# Patient Record
Sex: FEMALE | Race: WHITE | NOT HISPANIC OR LATINO | URBAN - METROPOLITAN AREA
[De-identification: names, ages, dates, MRNs, and addresses within clinical notes are randomized per-mention and may not be internally consistent; named-entity substitution may affect disease eponyms.]

---

## 2017-05-19 ENCOUNTER — EMERGENCY (EMERGENCY)
Facility: HOSPITAL | Age: 27
LOS: 1 days | Discharge: HOME | End: 2017-05-19
Attending: EMERGENCY MEDICINE
Payer: COMMERCIAL

## 2017-05-19 VITALS
OXYGEN SATURATION: 100 % | WEIGHT: 130.07 LBS | HEART RATE: 92 BPM | TEMPERATURE: 98 F | SYSTOLIC BLOOD PRESSURE: 115 MMHG | RESPIRATION RATE: 18 BRPM | HEIGHT: 64 IN | DIASTOLIC BLOOD PRESSURE: 79 MMHG

## 2017-05-19 DIAGNOSIS — Z88.0 ALLERGY STATUS TO PENICILLIN: ICD-10-CM

## 2017-05-19 DIAGNOSIS — R07.89 OTHER CHEST PAIN: ICD-10-CM

## 2017-05-19 DIAGNOSIS — R10.13 EPIGASTRIC PAIN: ICD-10-CM

## 2017-05-19 DIAGNOSIS — Z88.8 ALLERGY STATUS TO OTHER DRUGS, MEDICAMENTS AND BIOLOGICAL SUBSTANCES STATUS: ICD-10-CM

## 2017-05-19 DIAGNOSIS — R06.02 SHORTNESS OF BREATH: ICD-10-CM

## 2017-05-19 DIAGNOSIS — Z88.1 ALLERGY STATUS TO OTHER ANTIBIOTIC AGENTS STATUS: ICD-10-CM

## 2017-05-19 DIAGNOSIS — Z88.2 ALLERGY STATUS TO SULFONAMIDES: ICD-10-CM

## 2017-05-19 LAB
ALBUMIN SERPL ELPH-MCNC: 4.8 G/DL — SIGNIFICANT CHANGE UP (ref 3.3–5.2)
ALP SERPL-CCNC: 59 U/L — SIGNIFICANT CHANGE UP (ref 40–120)
ALT FLD-CCNC: 12 U/L — SIGNIFICANT CHANGE UP
ANION GAP SERPL CALC-SCNC: 14 MMOL/L — SIGNIFICANT CHANGE UP (ref 5–17)
AST SERPL-CCNC: 20 U/L — SIGNIFICANT CHANGE UP
BASOPHILS # BLD AUTO: 0 K/UL — SIGNIFICANT CHANGE UP (ref 0–0.2)
BASOPHILS NFR BLD AUTO: 0.4 % — SIGNIFICANT CHANGE UP (ref 0–2)
BILIRUB SERPL-MCNC: 0.3 MG/DL — LOW (ref 0.4–2)
BUN SERPL-MCNC: 13 MG/DL — SIGNIFICANT CHANGE UP (ref 8–20)
CALCIUM SERPL-MCNC: 9.5 MG/DL — SIGNIFICANT CHANGE UP (ref 8.6–10.2)
CHLORIDE SERPL-SCNC: 102 MMOL/L — SIGNIFICANT CHANGE UP (ref 98–107)
CO2 SERPL-SCNC: 24 MMOL/L — SIGNIFICANT CHANGE UP (ref 22–29)
CREAT SERPL-MCNC: 0.59 MG/DL — SIGNIFICANT CHANGE UP (ref 0.5–1.3)
D DIMER BLD IA.RAPID-MCNC: <150 NG/ML DDU — SIGNIFICANT CHANGE UP
EOSINOPHIL # BLD AUTO: 0.1 K/UL — SIGNIFICANT CHANGE UP (ref 0–0.5)
EOSINOPHIL NFR BLD AUTO: 1 % — SIGNIFICANT CHANGE UP (ref 0–6)
GLUCOSE SERPL-MCNC: 113 MG/DL — SIGNIFICANT CHANGE UP (ref 70–115)
HCT VFR BLD CALC: 42.6 % — SIGNIFICANT CHANGE UP (ref 37–47)
HGB BLD-MCNC: 14.7 G/DL — SIGNIFICANT CHANGE UP (ref 12–16)
LYMPHOCYTES # BLD AUTO: 1.9 K/UL — SIGNIFICANT CHANGE UP (ref 1–4.8)
LYMPHOCYTES # BLD AUTO: 26.3 % — SIGNIFICANT CHANGE UP (ref 20–55)
MCHC RBC-ENTMCNC: 32.5 PG — HIGH (ref 27–31)
MCHC RBC-ENTMCNC: 34.5 G/DL — SIGNIFICANT CHANGE UP (ref 32–36)
MCV RBC AUTO: 94 FL — SIGNIFICANT CHANGE UP (ref 81–99)
MONOCYTES # BLD AUTO: 0.5 K/UL — SIGNIFICANT CHANGE UP (ref 0–0.8)
MONOCYTES NFR BLD AUTO: 6.6 % — SIGNIFICANT CHANGE UP (ref 3–10)
NEUTROPHILS # BLD AUTO: 4.7 K/UL — SIGNIFICANT CHANGE UP (ref 1.8–8)
NEUTROPHILS NFR BLD AUTO: 65.6 % — SIGNIFICANT CHANGE UP (ref 37–73)
PLATELET # BLD AUTO: 244 K/UL — SIGNIFICANT CHANGE UP (ref 150–400)
POTASSIUM SERPL-MCNC: 4.3 MMOL/L — SIGNIFICANT CHANGE UP (ref 3.5–5.3)
POTASSIUM SERPL-SCNC: 4.3 MMOL/L — SIGNIFICANT CHANGE UP (ref 3.5–5.3)
PROT SERPL-MCNC: 8.1 G/DL — SIGNIFICANT CHANGE UP (ref 6.6–8.7)
RBC # BLD: 4.53 M/UL — SIGNIFICANT CHANGE UP (ref 4.4–5.2)
RBC # FLD: 12.5 % — SIGNIFICANT CHANGE UP (ref 11–15.6)
SODIUM SERPL-SCNC: 140 MMOL/L — SIGNIFICANT CHANGE UP (ref 135–145)
WBC # BLD: 7.1 K/UL — SIGNIFICANT CHANGE UP (ref 4.8–10.8)
WBC # FLD AUTO: 7.1 K/UL — SIGNIFICANT CHANGE UP (ref 4.8–10.8)

## 2017-05-19 PROCEDURE — 85027 COMPLETE CBC AUTOMATED: CPT

## 2017-05-19 PROCEDURE — 71046 X-RAY EXAM CHEST 2 VIEWS: CPT

## 2017-05-19 PROCEDURE — 99284 EMERGENCY DEPT VISIT MOD MDM: CPT

## 2017-05-19 PROCEDURE — 85379 FIBRIN DEGRADATION QUANT: CPT

## 2017-05-19 PROCEDURE — 80053 COMPREHEN METABOLIC PANEL: CPT

## 2017-05-19 PROCEDURE — 99284 EMERGENCY DEPT VISIT MOD MDM: CPT | Mod: 25

## 2017-05-19 PROCEDURE — 71020: CPT | Mod: 26

## 2017-05-19 PROCEDURE — 93005 ELECTROCARDIOGRAM TRACING: CPT

## 2017-05-19 PROCEDURE — 93010 ELECTROCARDIOGRAM REPORT: CPT

## 2017-05-19 PROCEDURE — 96374 THER/PROPH/DIAG INJ IV PUSH: CPT

## 2017-05-19 RX ORDER — PANTOPRAZOLE SODIUM 20 MG/1
40 TABLET, DELAYED RELEASE ORAL ONCE
Qty: 0 | Refills: 0 | Status: COMPLETED | OUTPATIENT
Start: 2017-05-19 | End: 2017-05-19

## 2017-05-19 RX ORDER — PANTOPRAZOLE SODIUM 20 MG/1
1 TABLET, DELAYED RELEASE ORAL
Qty: 30 | Refills: 0 | OUTPATIENT
Start: 2017-05-19 | End: 2017-06-18

## 2017-05-19 RX ADMIN — PANTOPRAZOLE SODIUM 40 MILLIGRAM(S): 20 TABLET, DELAYED RELEASE ORAL at 12:06

## 2017-05-19 NOTE — ED ADULT TRIAGE NOTE - CHIEF COMPLAINT QUOTE
mid sternal chest pain x few weeks, wheh taking a deep breath pain is worse and when she lays down pain is worse, feels tight mid sternal chest pain x few weeks, when taking a deep breath pain is worse and when she lays down pain is worse, feels tight, , went to urgent care this morning , had ekg and urine pregnancy test all was negative

## 2017-05-19 NOTE — ED STATDOCS - PROGRESS NOTE DETAILS
Patient presents c/o intermittent chest pressure, and SOB for past few weeks, patient states does have GERD, feels like that may be causing symptoms. patient denies calf pain, ALFARO. patient in room states not currently SOB, patient did fly however symptoms started prior to flight. patient in room comfortable in NAD, lungs CTA, calf soft nt nd bilaterally, will follow results and discuss with attending

## 2017-05-19 NOTE — ED STATDOCS - PLAN OF CARE
[place] diet for 2 weeks: no fatty foods, fried foods, spicy foods.  Avoid citrus products (oranges, lonnie, grapefruits).  Avoid seeded veggies (tomatoes, tomato sauces, peppers and cucumbers).  Avoid caffeine (coffee, soda). Pantoprazole as prescribed.  Return immediately to the ER for re-evaluation if your symptoms recur or worsening.

## 2017-05-19 NOTE — ED ADULT NURSE NOTE - OBJECTIVE STATEMENT
pt c/o a pain to upper epigastric area for about 5 weeks, reports intermittent pain , somewhat relieved by burping. pt reports hx of reflux but not currently on any medications for it. pt did experience some shortness of breath today

## 2017-05-19 NOTE — ED STATDOCS - ENMT, MLM
Nasal mucosa clear.  Mouth with normal mucosa  Throat has no vesicles, no oropharyngeal exudates and uvula is midline. Nasal mucosa clear.  Mouth with normal mucosa, no oropharyngeal erythema.

## 2017-05-19 NOTE — ED STATDOCS - NS ED MD SCRIBE ATTENDING SCRIBE SECTIONS
HISTORY OF PRESENT ILLNESS/HIV/PROGRESS NOTE/REVIEW OF SYSTEMS/VITAL SIGNS( Pullset)/CONSULTATIONS/SHIFT CHANGE/PAST MEDICAL/SURGICAL/SOCIAL HISTORY/INTAKE ASSESSMENT/SCREENINGS/RESULTS/PHYSICAL EXAM/DISPOSITION

## 2017-05-19 NOTE — ED STATDOCS - MEDICAL DECISION MAKING DETAILS
Likely GERD. Will obtain D-dimer, X-ray, omeprazole, and re-eval. Further testing as dictated by results.

## 2017-05-19 NOTE — ED STATDOCS - CARE PLAN
Principal Discharge DX:	Chest pain of uncertain etiology  Instructions for follow-up, activity and diet:	[place] diet for 2 weeks: no fatty foods, fried foods, spicy foods.  Avoid citrus products (oranges, lonnie, grapefruits).  Avoid seeded veggies (tomatoes, tomato sauces, peppers and cucumbers).  Avoid caffeine (coffee, soda). Pantoprazole as prescribed.  Return immediately to the ER for re-evaluation if your symptoms recur or worsening. Principal Discharge DX:	Chest pain of uncertain etiology  Instructions for follow-up, activity and diet:	[place] diet for 2 weeks: no fatty foods, fried foods, spicy foods.  Avoid citrus products (oranges, lonnie, grapefruits).  Avoid seeded veggies (tomatoes, tomato sauces, peppers and cucumbers).  Avoid caffeine (coffee, soda). Pantoprazole as prescribed.  Return immediately to the ER for re-evaluation if your symptoms recur or worsening.

## 2017-05-19 NOTE — ED ADULT NURSE NOTE - CHIEF COMPLAINT QUOTE
mid sternal chest pain x few weeks, when taking a deep breath pain is worse and when she lays down pain is worse, feels tight, , went to urgent care this morning , had ekg and urine pregnancy test all was negative

## 2017-05-19 NOTE — ED STATDOCS - CARDIAC, MLM
NO pedal edema, No calf Edema. Palpable Radial Pulses JAMISON. Palpable Dorsal Pedal  Pulses JAMISON. normal rate, regular rhythm, and no murmur.

## 2017-05-19 NOTE — ED STATDOCS - OBJECTIVE STATEMENT
25 y/o female with a hx of GERD (diagnosed with biopsy) presents to the ED c/o CP that onset yesterday. Pt has been having this pain on and off for 1 week with episode lasting about 1 hour. Pt notes that CP feels better at this time and feels similar to her GERD. Has had similar episodes in the past weeks but pain lasted longer this time. Pain is alleviated by blenching. Usually episode lasts about 1 hour and is alleviated by its own. Describes pain as a pressure with pain between shoulder blades as well as SOB. Takes oral contraceptive. Family hx of cardiac disease. Denies vomiting, leg swelling,  diaphoresis, numbness, tingling, long travel, leg pain, or MACHUCA. GI: Dr. Haines. No further complaints at this time. 27 y/o female with a hx of GERD (diagnosed with biopsy 7 yrs ago) presents to the ED c/o chest hurting and sob intermittently for a couple weeks:  sharp/ tight episodes raditing to back with assoc SOB lasting about 1 hour. Has had discomfort since last night, feels "really tight" although has improved on own since coming to the ED and appears to be alleviated by blenching. Usually episode lasts about 1 hour and is alleviated by its own. Describes pain as a pressure with pain between shoulder blades as well as SOB. Takes oral contraceptive. Family hx of cardiac disease. Denies vomiting, leg swelling,  diaphoresis, numbness, tingling, long travel, leg pain, or MACHUCA. GI: Dr. Haines. No further complaints at this time.

## 2018-02-27 ENCOUNTER — EMERGENCY (EMERGENCY)
Facility: HOSPITAL | Age: 28
LOS: 1 days | Discharge: HOME | End: 2018-02-27
Attending: EMERGENCY MEDICINE
Payer: COMMERCIAL

## 2018-02-27 VITALS
OXYGEN SATURATION: 100 % | TEMPERATURE: 98 F | HEART RATE: 90 BPM | DIASTOLIC BLOOD PRESSURE: 70 MMHG | SYSTOLIC BLOOD PRESSURE: 138 MMHG | RESPIRATION RATE: 18 BRPM

## 2018-02-27 VITALS — HEIGHT: 64 IN | WEIGHT: 130.07 LBS

## 2018-02-27 PROCEDURE — 99283 EMERGENCY DEPT VISIT LOW MDM: CPT

## 2018-02-27 PROCEDURE — 99283 EMERGENCY DEPT VISIT LOW MDM: CPT | Mod: 25

## 2018-02-27 RX ORDER — HYDROXYZINE HCL 10 MG
1 TABLET ORAL
Qty: 15 | Refills: 0 | OUTPATIENT
Start: 2018-02-27

## 2018-02-27 RX ORDER — LORATADINE 10 MG/1
10 TABLET ORAL DAILY
Qty: 0 | Refills: 0 | Status: DISCONTINUED | OUTPATIENT
Start: 2018-02-27 | End: 2018-03-03

## 2018-02-27 RX ORDER — FAMOTIDINE 10 MG/ML
1 INJECTION INTRAVENOUS
Qty: 5 | Refills: 0 | OUTPATIENT
Start: 2018-02-27 | End: 2018-03-03

## 2018-02-27 RX ORDER — FAMOTIDINE 10 MG/ML
20 INJECTION INTRAVENOUS DAILY
Qty: 0 | Refills: 0 | Status: DISCONTINUED | OUTPATIENT
Start: 2018-02-27 | End: 2018-03-03

## 2018-02-27 RX ORDER — CETIRIZINE HYDROCHLORIDE 10 MG/1
1 TABLET ORAL
Qty: 14 | Refills: 0 | OUTPATIENT
Start: 2018-02-27 | End: 2018-03-12

## 2018-02-27 RX ADMIN — FAMOTIDINE 20 MILLIGRAM(S): 10 INJECTION INTRAVENOUS at 08:58

## 2018-02-27 RX ADMIN — Medication 40 MILLIGRAM(S): at 08:58

## 2018-02-27 RX ADMIN — LORATADINE 10 MILLIGRAM(S): 10 TABLET ORAL at 08:58

## 2018-02-27 NOTE — ED ADULT TRIAGE NOTE - CHIEF COMPLAINT QUOTE
body hives, eyes, wrist, knees. pt get hives after a virus and benadryl not working.  breathing well.

## 2018-02-27 NOTE — ED STATDOCS - CARE PLAN
Principal Discharge DX:	Allergic reaction, urticaria  Assessment and plan of treatment:	take medications as prescribed:  claritin, pepcid and prednisone.  use atarax every 6 hours as needed for persistent itch.  stop taking pantoprazole if reaction recurs after finishing prescribed medications.  follow-up with dr khoury for further care.

## 2018-02-27 NOTE — ED STATDOCS - ATTENDING CONTRIBUTION TO CARE
itchy rash since thrusday without diff breathing or diff swallowing. taking benadryl for itch.  h/o urticaria in past; attributed to viral infection.  reports having URI symtpoms early last week for 3-4 days.  PE:  nontoxic appearing, NAD, chest CTA georgina, no tongue or lip swelling, urticarial lesions to torso.  A?p urticaria.  anticipatory guidance provided,.

## 2018-02-27 NOTE — ED STATDOCS - OBJECTIVE STATEMENT
28 y/o F with a PMHx of Gerd presents to the ED c/o nikko swollen hives all over body for 5 days intermittently appearing. This morning when the pt got out of the shower she had white welts all over herself and her face and lips were swollen as well. Pt has not seen a allergy doctor. Pt has had a allergy blood test done in the past with negative results. Pt had a skin graft on her foot in the past. She is taking Benadryl on and off. Last dose was last night at 2000. No new pets, detergent, or shampoo. She is allergic to penicillin and Erythromycin. Denies SOB and throat closing. No other complaints at this time.  PCP: Dr. Guerra.

## 2018-02-27 NOTE — ED STATDOCS - PLAN OF CARE
take medications as prescribed:  claritin, pepcid and prednisone.  use atarax every 6 hours as needed for persistent itch.  stop taking pantoprazole if reaction recurs after finishing prescribed medications.  follow-up with dr khoury for further care.

## 2025-02-21 NOTE — ED ADULT TRIAGE NOTE - WEIGHT IN KG
Received request via: Patient    Was the patient seen in the last year in this department? Yes    Does the patient have an active prescription (recently filled or refills available) for medication(s) requested? No    Pharmacy Name: [id number] - [place], [place] - [address] [place] [facility] [address]     Does the patient have longterm Plus and need 100-day supply? (This applies to ALL medications) Patient does not have SCP   59